# Patient Record
Sex: MALE | Race: WHITE | Employment: FULL TIME | URBAN - METROPOLITAN AREA
[De-identification: names, ages, dates, MRNs, and addresses within clinical notes are randomized per-mention and may not be internally consistent; named-entity substitution may affect disease eponyms.]

---

## 2019-08-04 ENCOUNTER — HOSPITAL ENCOUNTER (EMERGENCY)
Age: 64
Discharge: HOME | End: 2019-08-04
Attending: EMERGENCY MEDICINE
Payer: COMMERCIAL

## 2019-08-04 ENCOUNTER — APPOINTMENT (OUTPATIENT)
Dept: GENERAL RADIOLOGY | Age: 64
End: 2019-08-04
Attending: EMERGENCY MEDICINE
Payer: COMMERCIAL

## 2019-08-04 VITALS
OXYGEN SATURATION: 94 % | WEIGHT: 193.56 LBS | DIASTOLIC BLOOD PRESSURE: 87 MMHG | RESPIRATION RATE: 17 BRPM | SYSTOLIC BLOOD PRESSURE: 121 MMHG | TEMPERATURE: 98 F | HEART RATE: 75 BPM

## 2019-08-04 DIAGNOSIS — R07.89 ATYPICAL CHEST PAIN: Primary | ICD-10-CM

## 2019-08-04 LAB
ANION GAP SERPL CALC-SCNC: 12 MMOL/L (ref 5–15)
ATRIAL RATE: 87 BPM
BASOPHILS # BLD: 0 K/UL (ref 0–0.1)
BASOPHILS NFR BLD: 1 % (ref 0–1)
BUN SERPL-MCNC: 15 MG/DL (ref 6–20)
BUN/CREAT SERPL: 14 (ref 12–20)
CALCIUM SERPL-MCNC: 9 MG/DL (ref 8.5–10.1)
CALCULATED P AXIS, ECG09: 79 DEGREES
CALCULATED R AXIS, ECG10: 0 DEGREES
CALCULATED T AXIS, ECG11: 29 DEGREES
CHLORIDE SERPL-SCNC: 102 MMOL/L (ref 97–108)
CO2 SERPL-SCNC: 28 MMOL/L (ref 21–32)
CREAT SERPL-MCNC: 1.04 MG/DL (ref 0.7–1.3)
D DIMER PPP FEU-MCNC: 0.27 MG/L FEU (ref 0–0.65)
DIAGNOSIS, 93000: NORMAL
DIFFERENTIAL METHOD BLD: ABNORMAL
EOSINOPHIL # BLD: 0.2 K/UL (ref 0–0.4)
EOSINOPHIL NFR BLD: 3 % (ref 0–7)
ERYTHROCYTE [DISTWIDTH] IN BLOOD BY AUTOMATED COUNT: 13.4 % (ref 11.5–14.5)
GLUCOSE SERPL-MCNC: 173 MG/DL (ref 65–100)
HCT VFR BLD AUTO: 43.5 % (ref 36.6–50.3)
HGB BLD-MCNC: 15.3 G/DL (ref 12.1–17)
IMM GRANULOCYTES # BLD AUTO: 0 K/UL (ref 0–0.04)
IMM GRANULOCYTES NFR BLD AUTO: 1 % (ref 0–0.5)
LYMPHOCYTES # BLD: 1.9 K/UL (ref 0.8–3.5)
LYMPHOCYTES NFR BLD: 30 % (ref 12–49)
MCH RBC QN AUTO: 30.4 PG (ref 26–34)
MCHC RBC AUTO-ENTMCNC: 35.2 G/DL (ref 30–36.5)
MCV RBC AUTO: 86.3 FL (ref 80–99)
MONOCYTES # BLD: 0.5 K/UL (ref 0–1)
MONOCYTES NFR BLD: 7 % (ref 5–13)
NEUTS SEG # BLD: 3.7 K/UL (ref 1.8–8)
NEUTS SEG NFR BLD: 58 % (ref 32–75)
NRBC # BLD: 0 K/UL (ref 0–0.01)
NRBC BLD-RTO: 0 PER 100 WBC
P-R INTERVAL, ECG05: 152 MS
PLATELET # BLD AUTO: 206 K/UL (ref 150–400)
PMV BLD AUTO: 9.1 FL (ref 8.9–12.9)
POTASSIUM SERPL-SCNC: 3.6 MMOL/L (ref 3.5–5.1)
Q-T INTERVAL, ECG07: 378 MS
QRS DURATION, ECG06: 96 MS
QTC CALCULATION (BEZET), ECG08: 454 MS
RBC # BLD AUTO: 5.04 M/UL (ref 4.1–5.7)
SODIUM SERPL-SCNC: 142 MMOL/L (ref 136–145)
TROPONIN I SERPL-MCNC: <0.05 NG/ML
TROPONIN I SERPL-MCNC: <0.05 NG/ML
VENTRICULAR RATE, ECG03: 87 BPM
WBC # BLD AUTO: 6.3 K/UL (ref 4.1–11.1)

## 2019-08-04 PROCEDURE — 71046 X-RAY EXAM CHEST 2 VIEWS: CPT

## 2019-08-04 PROCEDURE — 99285 EMERGENCY DEPT VISIT HI MDM: CPT

## 2019-08-04 PROCEDURE — 93005 ELECTROCARDIOGRAM TRACING: CPT

## 2019-08-04 PROCEDURE — 74011000250 HC RX REV CODE- 250: Performed by: EMERGENCY MEDICINE

## 2019-08-04 PROCEDURE — 96375 TX/PRO/DX INJ NEW DRUG ADDON: CPT

## 2019-08-04 PROCEDURE — 84484 ASSAY OF TROPONIN QUANT: CPT

## 2019-08-04 PROCEDURE — 96374 THER/PROPH/DIAG INJ IV PUSH: CPT

## 2019-08-04 PROCEDURE — 85025 COMPLETE CBC W/AUTO DIFF WBC: CPT

## 2019-08-04 PROCEDURE — 74011250636 HC RX REV CODE- 250/636

## 2019-08-04 PROCEDURE — 74011250637 HC RX REV CODE- 250/637: Performed by: EMERGENCY MEDICINE

## 2019-08-04 PROCEDURE — 85379 FIBRIN DEGRADATION QUANT: CPT

## 2019-08-04 PROCEDURE — 80048 BASIC METABOLIC PNL TOTAL CA: CPT

## 2019-08-04 PROCEDURE — 36415 COLL VENOUS BLD VENIPUNCTURE: CPT

## 2019-08-04 RX ORDER — GUAIFENESIN 100 MG/5ML
324 LIQUID (ML) ORAL
Status: COMPLETED | OUTPATIENT
Start: 2019-08-04 | End: 2019-08-04

## 2019-08-04 RX ORDER — FAMOTIDINE 20 MG/1
20 TABLET, FILM COATED ORAL
Status: COMPLETED | OUTPATIENT
Start: 2019-08-04 | End: 2019-08-04

## 2019-08-04 RX ORDER — MORPHINE SULFATE 2 MG/ML
2 INJECTION, SOLUTION INTRAMUSCULAR; INTRAVENOUS
Status: COMPLETED | OUTPATIENT
Start: 2019-08-04 | End: 2019-08-04

## 2019-08-04 RX ORDER — MORPHINE SULFATE 2 MG/ML
INJECTION, SOLUTION INTRAMUSCULAR; INTRAVENOUS
Status: DISCONTINUED
Start: 2019-08-04 | End: 2019-08-04 | Stop reason: HOSPADM

## 2019-08-04 RX ORDER — NITROGLYCERIN 0.4 MG/1
0.4 TABLET SUBLINGUAL
Status: DISCONTINUED | OUTPATIENT
Start: 2019-08-04 | End: 2019-08-04 | Stop reason: HOSPADM

## 2019-08-04 RX ADMIN — NITROGLYCERIN 0.4 MG: 0.4 TABLET, ORALLY DISINTEGRATING SUBLINGUAL at 00:38

## 2019-08-04 RX ADMIN — FAMOTIDINE 20 MG: 20 TABLET ORAL at 02:53

## 2019-08-04 RX ADMIN — LIDOCAINE HYDROCHLORIDE 40 ML: 20 SOLUTION ORAL; TOPICAL at 00:36

## 2019-08-04 RX ADMIN — MORPHINE SULFATE 2 MG: 2 INJECTION, SOLUTION INTRAMUSCULAR; INTRAVENOUS at 01:04

## 2019-08-04 RX ADMIN — ASPIRIN 81 MG 324 MG: 81 TABLET ORAL at 00:36

## 2019-08-04 RX ADMIN — NITROGLYCERIN 0.4 MG: 0.4 TABLET, ORALLY DISINTEGRATING SUBLINGUAL at 00:46

## 2019-08-04 NOTE — DISCHARGE INSTRUCTIONS

## 2019-08-04 NOTE — ED TRIAGE NOTES
Chest pain for a few hours. Denies SOB or n/v. States that it is left sides and non radiating.  Hx of MI 2005

## 2019-08-04 NOTE — ED PROVIDER NOTES
The history is provided by the patient. Chest Pain (Angina)    This is a new problem. The current episode started 1 to 2 hours ago. The problem has not changed since onset. Duration of episode(s) is 1 hour. The problem occurs constantly. The pain is associated with breathing. The pain is present in the left side. The pain is at a severity of 4/10. Quality: undescribable but different from MI. The pain does not radiate. The symptoms are aggravated by deep breathing. Pertinent negatives include no abdominal pain, no cough, no diaphoresis, no dizziness, no fever, no nausea, no shortness of breath and no vomiting. He has tried nothing for the symptoms. Risk factors include male gender and cardiac disease. His past medical history does not include DM or HTN. Procedural history includes cardiac stents. Past Medical History:   Diagnosis Date    Heart attack [facility]) 2005       History reviewed. No pertinent surgical history. History reviewed. No pertinent family history.     Social History     Socioeconomic History    Marital status: UNKNOWN     Spouse name: Not on file    Number of children: Not on file    Years of education: Not on file    Highest education level: Not on file   Occupational History    Not on file   Social Needs    Financial resource strain: Not on file    Food insecurity:     Worry: Not on file     Inability: Not on file    Transportation needs:     Medical: Not on file     Non-medical: Not on file   Tobacco Use    Smoking status: Never Smoker    Smokeless tobacco: Never Used   Substance and Sexual Activity    Alcohol use: Not on file    Drug use: Not on file    Sexual activity: Not on file   Lifestyle    Physical activity:     Days per week: Not on file     Minutes per session: Not on file    Stress: Not on file   Relationships    Social connections:     Talks on phone: Not on file     Gets together: Not on file     Attends Zoroastrianism service: Not on file     Active member of club or organization: Not on file     Attends meetings of clubs or organizations: Not on file     Relationship status: Not on file    Intimate partner violence:     Fear of current or ex partner: Not on file     Emotionally abused: Not on file     Physically abused: Not on file     Forced sexual activity: Not on file   Other Topics Concern    Not on file   Social History Narrative    Not on file         ALLERGIES: Penicillins    Review of Systems   Constitutional: Negative for chills, diaphoresis and fever. Respiratory: Negative for cough and shortness of breath. Cardiovascular: Positive for chest pain. Negative for leg swelling. Gastrointestinal: Negative for abdominal pain, constipation, diarrhea, nausea and vomiting. Neurological: Negative for dizziness and light-headedness. All other systems reviewed and are negative. There were no vitals filed for this visit. Physical Exam   Constitutional: He is oriented to person, place, and time. He appears well-developed and well-nourished. No distress. HENT:   Head: Normocephalic and atraumatic. Eyes: Pupils are equal, round, and reactive to light. Conjunctivae are normal.   Neck: Normal range of motion. Cardiovascular: Normal rate and regular rhythm. Pulmonary/Chest: Effort normal and breath sounds normal.   Abdominal: Soft. He exhibits no distension. There is no tenderness. Musculoskeletal: Normal range of motion. Right lower leg: He exhibits no tenderness and no edema. Left lower leg: He exhibits no tenderness and no edema. Neurological: He is alert and oriented to person, place, and time. Skin: Skin is warm and dry. Capillary refill takes less than 2 seconds. Nursing note and vitals reviewed. MDM  Number of Diagnoses or Management Options  Atypical chest pain:   Diagnosis management comments: The patient is resting comfortably and feels better, is alert and in no distress.  The repeat examination is unremarkable and benign. The electrocardiogram shows no signs of acute ischemia and the history, exam, diagnostic testing and current condition do not suggest that this patient is having an acute myocardial infarction, significant arrhythmia, unstable angina, esophageal perforation, pulmonary embolism, aortic dissection, pneumothorax, severe pneumonia, sepsis or other significant pathology that would warrant further testing, continued ED treatment, admission, or cardiology or other specialist consultation at this point. The vital signs have been stable. The patient's condition is stable and appropriate for discharge. The patient will pursue further outpatient evaluation with the primary care physician, other designated physician or cardiologist. The patient and/or caregivers have expressed a clear and thorough understanding and agree to follow up as instructed.            Procedures        EKG @ 00:19  NSR, 87 bpm, no ST changes, no T wave changes, no ectopy  EKG interpreted by Randi Frederick MD

## 2022-10-24 ENCOUNTER — APPOINTMENT (OUTPATIENT)
Dept: GENERAL RADIOLOGY | Age: 67
End: 2022-10-24
Attending: EMERGENCY MEDICINE

## 2022-10-24 ENCOUNTER — HOSPITAL ENCOUNTER (EMERGENCY)
Age: 67
Discharge: HOME | End: 2022-10-24
Attending: EMERGENCY MEDICINE

## 2022-10-24 VITALS
SYSTOLIC BLOOD PRESSURE: 137 MMHG | OXYGEN SATURATION: 98 % | RESPIRATION RATE: 17 BRPM | TEMPERATURE: 97.9 F | DIASTOLIC BLOOD PRESSURE: 77 MMHG | HEART RATE: 76 BPM

## 2022-10-24 DIAGNOSIS — S39.012A LUMBAR STRAIN, INITIAL ENCOUNTER: Primary | ICD-10-CM

## 2022-10-24 PROCEDURE — 99283 EMERGENCY DEPT VISIT LOW MDM: CPT

## 2022-10-24 PROCEDURE — 72100 X-RAY EXAM L-S SPINE 2/3 VWS: CPT

## 2022-10-24 RX ORDER — CYCLOBENZAPRINE HCL 10 MG
10 TABLET ORAL
Qty: 15 TABLET | Refills: 0 | Status: SHIPPED | OUTPATIENT
Start: 2022-10-24 | End: 2022-10-29

## 2022-10-24 RX ORDER — IBUPROFEN 800 MG/1
800 TABLET ORAL
Qty: 30 TABLET | Refills: 0 | Status: SHIPPED | OUTPATIENT
Start: 2022-10-24

## 2022-10-24 NOTE — ED PROVIDER NOTES
20-year-old male with lower back pain from twisting in a chair 2 days ago. He has some mild pain radiating down the posterior part of his left leg. He has off-and-on back pain for about 40 years but never required surgery or referral to neurosurgery. He has never had an MRI. No fever or chills no urine symptoms. The history is provided by the patient. LOW BACK PAIN   This is a recurrent problem. The current episode started 2 days ago. The problem has been gradually improving. The problem occurs constantly. Patient reports not work related injury. The pain is associated with no known injury. The pain is present in the lower back. The quality of the pain is described as shooting. The pain is mild. Pertinent negatives include no chest pain, no fever, no numbness, no weight loss, no headaches, no abdominal pain, no abdominal swelling, no bowel incontinence, no bladder incontinence, no dysuria and no weakness. He has tried nothing for the symptoms. Past Medical History:   Diagnosis Date    Heart attack ([facility] [address] 2005       No past surgical history on file. No family history on file.     Social History     Socioeconomic History    Marital status:      Spouse name: Not on file    Number of children: Not on file    Years of education: Not on file    Highest education level: Not on file   Occupational History    Not on file   Tobacco Use    Smoking status: Never    Smokeless tobacco: Never   Substance and Sexual Activity    Alcohol use: Not on file    Drug use: Not on file    Sexual activity: Not on file   Other Topics Concern    Not on file   Social History Narrative    Not on file     Social Determinants of Health     Financial Resource Strain: Not on file   Food Insecurity: Not on file   Transportation Needs: Not on file   Physical Activity: Not on file   Stress: Not on file   Social Connections: Not on file   Intimate Partner Violence: Not on file   Housing Stability: Not on file         ALLERGIES: Penicillins    Review of Systems   Constitutional:  Negative for chills, fever and weight loss. HENT:  Negative for congestion, rhinorrhea, sneezing and sore throat. Eyes:  Negative for redness and visual disturbance. Respiratory:  Negative for shortness of breath. Cardiovascular:  Negative for chest pain and leg swelling. Gastrointestinal:  Negative for abdominal pain, bowel incontinence, nausea and vomiting. Genitourinary:  Negative for bladder incontinence, difficulty urinating, dysuria and frequency. Musculoskeletal:  Negative for back pain, myalgias and neck stiffness. Skin:  Negative for rash. Neurological:  Negative for dizziness, syncope, weakness, numbness and headaches. Hematological:  Negative for adenopathy. All other systems reviewed and are negative. Vitals:    10/24/22 1326 10/24/22 1448   BP: (!) 142/86    Pulse: 78 76   Resp: 16 17   Temp: 97.9 °F (36.6 °C)    SpO2: 97%             Physical Exam  Vitals and nursing note reviewed. Constitutional:       Appearance: Normal appearance. He is well-developed. HENT:      Head: Normocephalic and atraumatic. Cardiovascular:      Rate and Rhythm: Normal rate and regular rhythm. Pulses: Normal pulses. Heart sounds: Normal heart sounds. Pulmonary:      Effort: Pulmonary effort is normal. No respiratory distress. Breath sounds: Normal breath sounds. Chest:      Chest wall: No tenderness. Abdominal:      General: Bowel sounds are normal.      Palpations: Abdomen is soft. Tenderness: There is no abdominal tenderness. There is no guarding or rebound. Musculoskeletal:      Cervical back: Full passive range of motion without pain, normal range of motion and neck supple. Skin:     General: Skin is warm and dry. Findings: No erythema or rash. Neurological:      Mental Status: He is alert and oriented to person, place, and time.    Psychiatric:         Speech: Speech normal.         Behavior: Behavior normal.         Thought Content: Thought content normal.         Judgment: Judgment normal.        MDM  Number of Diagnoses or Management Options  Lumbar strain, initial encounter  Diagnosis management comments: Back pain after twisting movement and history of back pain as well. He is otherwise stable. Will be treated for lumbar strain with Flexeril and ibuprofen.            Procedures

## 2022-10-24 NOTE — ED TRIAGE NOTES
Pt reports back pain since Saturday. Started as \"twinge\", now pain is worse radiating down L leg. No bladder/bowel incontinence. Hx of same. No relief with home meds.

## 2025-05-14 ENCOUNTER — HOSPITAL ENCOUNTER (OUTPATIENT)
Facility: HOSPITAL | Age: 70
Discharge: HOME | End: 2025-05-14
Attending: INTERNAL MEDICINE | Admitting: INTERNAL MEDICINE
Payer: MEDICARE

## 2025-05-14 VITALS
HEART RATE: 71 BPM | TEMPERATURE: 97.6 F | BODY MASS INDEX: 28.56 KG/M2 | RESPIRATION RATE: 17 BRPM | DIASTOLIC BLOOD PRESSURE: 64 MMHG | SYSTOLIC BLOOD PRESSURE: 119 MMHG | WEIGHT: 182 LBS | HEIGHT: 67 IN | OXYGEN SATURATION: 92 %

## 2025-05-14 DIAGNOSIS — R94.39 ABNORMAL MYOCARDIAL PERFUSION STUDY: ICD-10-CM

## 2025-05-14 DIAGNOSIS — Z95.5 STENTED CORONARY ARTERY: Primary | ICD-10-CM

## 2025-05-14 LAB — ECHO BSA: 1.98 M2

## 2025-05-14 PROCEDURE — 92978 ENDOLUMINL IVUS OCT C 1ST: CPT | Performed by: INTERNAL MEDICINE

## 2025-05-14 PROCEDURE — C1725 CATH, TRANSLUMIN NON-LASER: HCPCS | Performed by: INTERNAL MEDICINE

## 2025-05-14 PROCEDURE — 2500000003 HC RX 250 WO HCPCS: Performed by: INTERNAL MEDICINE

## 2025-05-14 PROCEDURE — 99152 MOD SED SAME PHYS/QHP 5/>YRS: CPT | Performed by: INTERNAL MEDICINE

## 2025-05-14 PROCEDURE — C1887 CATHETER, GUIDING: HCPCS | Performed by: INTERNAL MEDICINE

## 2025-05-14 PROCEDURE — 2580000003 HC RX 258: Performed by: INTERNAL MEDICINE

## 2025-05-14 PROCEDURE — 93458 L HRT ARTERY/VENTRICLE ANGIO: CPT | Performed by: INTERNAL MEDICINE

## 2025-05-14 PROCEDURE — 99153 MOD SED SAME PHYS/QHP EA: CPT | Performed by: INTERNAL MEDICINE

## 2025-05-14 PROCEDURE — C1769 GUIDE WIRE: HCPCS | Performed by: INTERNAL MEDICINE

## 2025-05-14 PROCEDURE — 6360000002 HC RX W HCPCS: Performed by: INTERNAL MEDICINE

## 2025-05-14 PROCEDURE — 6360000004 HC RX CONTRAST MEDICATION: Performed by: INTERNAL MEDICINE

## 2025-05-14 PROCEDURE — C1713 ANCHOR/SCREW BN/BN,TIS/BN: HCPCS | Performed by: INTERNAL MEDICINE

## 2025-05-14 PROCEDURE — C9600 PERC DRUG-EL COR STENT SING: HCPCS | Performed by: INTERNAL MEDICINE

## 2025-05-14 PROCEDURE — 2709999900 HC NON-CHARGEABLE SUPPLY: Performed by: INTERNAL MEDICINE

## 2025-05-14 PROCEDURE — C1753 CATH, INTRAVAS ULTRASOUND: HCPCS | Performed by: INTERNAL MEDICINE

## 2025-05-14 PROCEDURE — 76937 US GUIDE VASCULAR ACCESS: CPT | Performed by: INTERNAL MEDICINE

## 2025-05-14 PROCEDURE — C1874 STENT, COATED/COV W/DEL SYS: HCPCS | Performed by: INTERNAL MEDICINE

## 2025-05-14 PROCEDURE — 93005 ELECTROCARDIOGRAM TRACING: CPT | Performed by: INTERNAL MEDICINE

## 2025-05-14 PROCEDURE — C1894 INTRO/SHEATH, NON-LASER: HCPCS | Performed by: INTERNAL MEDICINE

## 2025-05-14 PROCEDURE — 6370000000 HC RX 637 (ALT 250 FOR IP): Performed by: INTERNAL MEDICINE

## 2025-05-14 DEVICE — STENT ONYXNG40012UX ONYX 4.00X12RX
Type: IMPLANTABLE DEVICE | Status: FUNCTIONAL
Brand: ONYX FRONTIER™

## 2025-05-14 RX ORDER — ACETAMINOPHEN 325 MG/1
650 TABLET ORAL EVERY 4 HOURS PRN
Status: DISCONTINUED | OUTPATIENT
Start: 2025-05-14 | End: 2025-05-14 | Stop reason: HOSPADM

## 2025-05-14 RX ORDER — FENTANYL CITRATE 50 UG/ML
INJECTION, SOLUTION INTRAMUSCULAR; INTRAVENOUS PRN
Status: DISCONTINUED | OUTPATIENT
Start: 2025-05-14 | End: 2025-05-14 | Stop reason: HOSPADM

## 2025-05-14 RX ORDER — MIDAZOLAM HYDROCHLORIDE 1 MG/ML
INJECTION, SOLUTION INTRAMUSCULAR; INTRAVENOUS PRN
Status: DISCONTINUED | OUTPATIENT
Start: 2025-05-14 | End: 2025-05-14 | Stop reason: HOSPADM

## 2025-05-14 RX ORDER — SODIUM CHLORIDE 9 MG/ML
INJECTION, SOLUTION INTRAVENOUS PRN
Status: DISCONTINUED | OUTPATIENT
Start: 2025-05-14 | End: 2025-05-14 | Stop reason: HOSPADM

## 2025-05-14 RX ORDER — ROSUVASTATIN CALCIUM 5 MG/1
5 TABLET, COATED ORAL DAILY
COMMUNITY

## 2025-05-14 RX ORDER — SODIUM CHLORIDE 0.9 % (FLUSH) 0.9 %
5-40 SYRINGE (ML) INJECTION PRN
Status: DISCONTINUED | OUTPATIENT
Start: 2025-05-14 | End: 2025-05-14 | Stop reason: HOSPADM

## 2025-05-14 RX ORDER — METOPROLOL TARTRATE 50 MG
50 TABLET ORAL 2 TIMES DAILY
COMMUNITY

## 2025-05-14 RX ORDER — HEPARIN SODIUM 1000 [USP'U]/ML
INJECTION, SOLUTION INTRAVENOUS; SUBCUTANEOUS PRN
Status: DISCONTINUED | OUTPATIENT
Start: 2025-05-14 | End: 2025-05-14 | Stop reason: HOSPADM

## 2025-05-14 RX ORDER — BIVALIRUDIN 250 MG/5ML
INJECTION, POWDER, LYOPHILIZED, FOR SOLUTION INTRAVENOUS PRN
Status: DISCONTINUED | OUTPATIENT
Start: 2025-05-14 | End: 2025-05-14 | Stop reason: HOSPADM

## 2025-05-14 RX ORDER — VERAPAMIL HYDROCHLORIDE 2.5 MG/ML
INJECTION INTRAVENOUS PRN
Status: DISCONTINUED | OUTPATIENT
Start: 2025-05-14 | End: 2025-05-14 | Stop reason: HOSPADM

## 2025-05-14 RX ORDER — IOPAMIDOL 755 MG/ML
INJECTION, SOLUTION INTRAVASCULAR PRN
Status: DISCONTINUED | OUTPATIENT
Start: 2025-05-14 | End: 2025-05-14 | Stop reason: HOSPADM

## 2025-05-14 RX ORDER — CLOPIDOGREL BISULFATE 75 MG/1
75 TABLET ORAL DAILY
Qty: 90 TABLET | Refills: 3 | Status: SHIPPED | OUTPATIENT
Start: 2025-05-14

## 2025-05-14 RX ORDER — SODIUM CHLORIDE 0.9 % (FLUSH) 0.9 %
5-40 SYRINGE (ML) INJECTION EVERY 12 HOURS SCHEDULED
Status: DISCONTINUED | OUTPATIENT
Start: 2025-05-14 | End: 2025-05-14 | Stop reason: HOSPADM

## 2025-05-14 RX ORDER — TAMSULOSIN HYDROCHLORIDE 0.4 MG/1
0.4 CAPSULE ORAL DAILY
COMMUNITY

## 2025-05-14 RX ORDER — ASPIRIN 81 MG/1
81 TABLET ORAL DAILY
COMMUNITY

## 2025-05-14 RX ORDER — LIDOCAINE HYDROCHLORIDE 10 MG/ML
INJECTION, SOLUTION INFILTRATION; PERINEURAL PRN
Status: DISCONTINUED | OUTPATIENT
Start: 2025-05-14 | End: 2025-05-14 | Stop reason: HOSPADM

## 2025-05-14 NOTE — PROGRESS NOTES
CATH LAB to RECOVERY ROOM REPORT    Procedure: PCI with Stent Placement    MD: ROSALVA Doshi MD And DR. Liao for INTERVENTION    The procedure was an intervention with 1 stent(s) placed to the LAD .    Verbal Report given to Recovery Nurse on patient being transferred to Cardiac Cath Lab  for routine post-op. Patient stable upon transfer to .  Vitals, mental status, MAR, procedural summary discussed with recovery RN.    Medication given during procedure:    Contrast:140 mL                          Heparin: 2,000 units     Versed: 4 mg                                                               Fentanyl: 100  mcg                                                           Plavix:600  mg           Angiomax running 28.9 ml/hr.  Stop drip 2:30 pm     Other Meds/Drips given:  Verapamil 2.5 mg  Nitroglycerin 1 inch topical applied           Sheaths:    Right radial sheath pulled at 1:25 pm, band at 12  mL of air

## 2025-05-14 NOTE — PROCEDURES
PROCEDURE NOTE  Date: 5/14/2025   Name: Adair Mcgowan  YOB: 1955    Procedures      Cardiac Catheterization Procedure Note   Patient: Adair Mcgowan  MRN: [id number]  SSN: [id number]   YOB: 1955 Age: 69 y.o.  Sex: male    Date of Procedure: 5/14/2025   Pre-procedure Diagnosis: CAD, abnormal stress  Post-procedure Diagnosis: CAD, abnormal stress  Procedure: LHC, Cors, US guided access, moderate sedation  :  Dr. Moise Doshi MD    Assistant(s):  None  Anesthesia: Moderate Sedation   Estimated Blood Loss: Less than 10 mL   Specimens Removed: None  Findings: Right dominant coronary system with significant disease in LAD, patent previously placed RCA stent, normal LVEDP  Complications: None   Implants:  None  Signed by:  Moise Doshi MD  5/14/2025  12:57 PM

## 2025-05-14 NOTE — DISCHARGE INSTRUCTIONS
Patient Discharge Instructions    Adair Mcgowan / [id number] : 1955    Admitted 2025 Discharged: 2025         CARDIAC CATHETERIZATION/ “CATH” STENT PLACEMENT   DISCHARGE INSTRUCTIONS    If possible, have someone stay with you for the first night. It is normal to feel tired for the first couple of days.  Take it easy and follow your physician’s instructions on activity.    CHECK THE CATHETER INSERTION SITE DAILY:    If bleeding at the cath site occurs, take a clean washcloth and apply direct pressure just above the puncture site for at least 15 minutes.  Call 911 immediately if the bleeding is not controlled.  Continue to apply direct pressure until an ambulance gets to your location. Do not try to drive yourself or have someone else drive you to the hospital.  Have the ambulance bring you to the emergency room.    You may shower 24 hours after your procedure. Gently remove the bandage during showering.  Wash with soap and water and pat dry.  To prevent infection, keep the groin area/insertion site clean and dry.  Do not apply powders, creams, lotions, or ointments to the site for 5 days. You may cover the site with a fresh Band-Aid each day until well healed.  You may notice a small lump at the site. This is normal and may last up to 6 weeks.    CALL THE PHYSICIAN:  If the site becomes red, swollen, or feels warm to the touch, or is healing poorly    If you note any large/extending bruise, fever >101.0 or chills  If your extremity has numbness, tingling, discoloration, abnormal swelling, tightness or pain   If you have difficulty with urination or develop nausea, vomiting, or severe abdominal pain    ACTIVITY for the first 24-48 hours, or as instructed by your physician:  No lifting, pushing or pulling over 10 pounds and no straining the insertion site. Do not lift grocery bags or the garbage can; do not run the vacuum  or  for 7 days.  You may start walking short distances

## 2025-05-14 NOTE — PROGRESS NOTES
Cardiac Cath Lab Procedure Area Arrival Note:    Adair Mcgowan arrived to Cardiac Cath Lab, Procedure Area. Patient identifiers verified with NAME and DATE OF BIRTH. Procedure verified with patient. Consent forms verified. Allergies verified. Patient informed of procedure and plan of care. Questions answered with review. Patient voiced understanding of procedure and plan of care.    Patient on cardiac monitor, non-invasive blood pressure, SPO2 monitor. On   O2 @ 2 lpm via nc.  IV of NS on pump at 25 ml/hr. Patient status doing well without problems. Patient is A&Ox 4. Patient reports no pain or other concerns.     Patient medicated during procedure with orders obtained and verified by Dr. chawla.    Refer to patients Cardiac Cath Lab PROCEDURE REPORT for vital signs, assessment, status, and response during procedure, printed at end of case. Printed report on chart or scanned into chart.

## 2025-05-14 NOTE — CARDIO/PULMONARY
Chart reviewed: Patient is 69 y.o. male admitted with Abnormal myocardial perfusion study [R94.39]    Education: CAD education folder given to Adair Mcgowan.      Educated using teach back method. Reviewed CAD diagnosis definition and purpose of intervention. Emphasized the value of cardiac rehab. Discussed Cardiac Rehab Program format, benefits, and encouraged enrollment to assist with risk modification and management. Initial Cardiac Rehab Program intake appointment scheduled for 5/20/25 at 9 am and appointment information is on the AVS.    Patient stated \"I am raising 2 children\". He has an 8 year old and a 15 year old at home.     Adair Mcgowan verbalized understanding with questions answered.     AGUSTINA DYE RN   Price (Do Not Change): 0.00 Detail Level: Simple Instructions: This plan will send the code FBSE to the PM system.  DO NOT or CHANGE the price.

## 2025-05-14 NOTE — PROGRESS NOTES
Cardiac Cath Lab Recovery Arrival Note:      Adair Mcgowan arrived to Cardiac Cath Lab, Recovery Area. Staff introduced to patient. Patient identifiers verified with NAME and DATE OF BIRTH. Procedure verified with patient. Consent forms reviewed and signed by patient or authorized representative and verified. Allergies verified.     Patient and family oriented to department. Patient and family informed of procedure and plan of care.     Questions answered with review. Patient prepped for procedure, per orders from physician, prior to arrival.    Patient on cardiac monitor, non-invasive blood pressure, SPO2 monitor. On room air. Patient is A&Ox 4. Patient reports no pain.     Patient in stretcher, in low position, with side rails up, call bell within reach, patient instructed to call if assistance as needed.    Patient prep in: [facility] Recovery Area, [place] FAST TRACK 2.   Patient family has pager # [phone]  Family in: [phone].   Prep by: ANGIE

## 2025-05-14 NOTE — PROGRESS NOTES
Ambulated patient to the bathroom with a steady gait with steady gait, voided without difficulty. Returned to stretcher. Vital signs stable.     Radial Site is clean, dry, and intact. No bleeding, no hematoma.     Patient dressed self.     Educated patient about their sedation precautions such as not driving, operating any machinery, or signing legal documents 24 hours post procedure.     Reviewed discharge instructions, including medications, and site care, Patient educated about their post-PCI medications, regarding aspirin, Plavix, statins, and their blood pressure medication. The patient was given their stent card and instructed to keep it in their wallet.  using the teach back method. Answered all questions. Verbalized understanding.     Removed peripheral IV    Escorted to discharge area in a wheelchair with all of their belongings including clothing, cell phone, stent card, discharge instructions    1750--spouse present to take patient home. Reviewed discharge instructions with spouse. Verbalized understanding.

## 2025-05-14 NOTE — PROCEDURES
PROCEDURE NOTE  Date: 5/14/2025   Name: Adair Mcgowan  YOB: 1955    Procedures    Cardiac Catheterization Procedure Note   Patient: Adair Mcgowan  MRN: [id number]  SSN: [id number]   YOB: 1955 Age: 69 y.o.  Sex: male    Date of Procedure: 5/14/2025   Pre-procedure Diagnosis: Coronary Artery Disease and Abnormal stress test  Post-procedure Diagnosis: Coronary Artery Disease  Procedure: PCI of the LAD, IVUS of the LAD  :  Dr. Eren Liao MD    Assistant(s):  None  Anesthesia: Moderate Sedation   Estimated Blood Loss: Less than 10 mL   Specimens Removed: None    Access: right radial    Note: balloon assisted tracking was needed to advance the guide from the right radial to the ascending aorta.    Findings:   Diagnostic coronary angiography performed by Dr Doshi showed 80-90% stenosis in the proximal LAD    LAD Intervention:    Guide: EBU 3.5  Anticoagulation: Angiomax  Lesion crossing: Runthrough    IVUS of the LAD was performed for vessel sizing and lesion characterization.  Reference vessel size was 4.0mm    PTCA of the proximal LAD was performed using a 4.0x12mm balloon  A 4.0x12mm Rafael NAYELI was implanted in the proximal LAD.    Final IVUS and angiography showed excellent results with no residual stenosis, BONNY 3 flow, no dissection and well apposed well expanded stents.     Complications: None   Implants:  NAYELI x 1  Signed by:  Eren Liao MD  5/14/2025  1:29 PM

## 2025-05-16 LAB
EKG ATRIAL RATE: 68 BPM
EKG DIAGNOSIS: NORMAL
EKG P AXIS: 61 DEGREES
EKG P-R INTERVAL: 166 MS
EKG Q-T INTERVAL: 422 MS
EKG QRS DURATION: 100 MS
EKG QTC CALCULATION (BAZETT): 448 MS
EKG R AXIS: -1 DEGREES
EKG T AXIS: 31 DEGREES
EKG VENTRICULAR RATE: 68 BPM

## 2025-05-16 PROCEDURE — 93010 ELECTROCARDIOGRAM REPORT: CPT | Performed by: SPECIALIST

## 2025-05-20 ENCOUNTER — HOSPITAL ENCOUNTER (OUTPATIENT)
Facility: HOSPITAL | Age: 70
Setting detail: RECURRING SERIES
Discharge: HOME | End: 2025-05-23
Attending: INTERNAL MEDICINE
Payer: MEDICARE

## 2025-05-20 VITALS — WEIGHT: 186 LBS | HEART RATE: 66 BPM | BODY MASS INDEX: 29.19 KG/M2 | HEIGHT: 67 IN

## 2025-05-20 PROCEDURE — 93798 PHYS/QHP OP CAR RHAB W/ECG: CPT

## 2025-05-20 PROCEDURE — 93797 PHYS/QHP OP CAR RHAB WO ECG: CPT

## 2025-05-20 ASSESSMENT — PATIENT HEALTH QUESTIONNAIRE - PHQ9
SUM OF ALL RESPONSES TO PHQ QUESTIONS 1-9: 6
5. POOR APPETITE OR OVEREATING: SEVERAL DAYS
4. FEELING TIRED OR HAVING LITTLE ENERGY: SEVERAL DAYS
SUM OF ALL RESPONSES TO PHQ QUESTIONS 1-9: 6
7. TROUBLE CONCENTRATING ON THINGS, SUCH AS READING THE NEWSPAPER OR WATCHING TELEVISION: NOT AT ALL
10. IF YOU CHECKED OFF ANY PROBLEMS, HOW DIFFICULT HAVE THESE PROBLEMS MADE IT FOR YOU TO DO YOUR WORK, TAKE CARE OF THINGS AT HOME, OR GET ALONG WITH OTHER PEOPLE: NOT DIFFICULT AT ALL
8. MOVING OR SPEAKING SO SLOWLY THAT OTHER PEOPLE COULD HAVE NOTICED. OR THE OPPOSITE, BEING SO FIGETY OR RESTLESS THAT YOU HAVE BEEN MOVING AROUND A LOT MORE THAN USUAL: NOT AT ALL
SUM OF ALL RESPONSES TO PHQ QUESTIONS 1-9: 6
2. FEELING DOWN, DEPRESSED OR HOPELESS: SEVERAL DAYS
3. TROUBLE FALLING OR STAYING ASLEEP: SEVERAL DAYS
6. FEELING BAD ABOUT YOURSELF - OR THAT YOU ARE A FAILURE OR HAVE LET YOURSELF OR YOUR FAMILY DOWN: SEVERAL DAYS
9. THOUGHTS THAT YOU WOULD BE BETTER OFF DEAD, OR OF HURTING YOURSELF: NOT AT ALL
SUM OF ALL RESPONSES TO PHQ QUESTIONS 1-9: 6
1. LITTLE INTEREST OR PLEASURE IN DOING THINGS: SEVERAL DAYS

## 2025-05-20 ASSESSMENT — EXERCISE STRESS TEST
PEAK_HR: 85
PEAK_BP: 150/77
PEAK_RPE: 11
PEAK_METS: 2.1

## 2025-05-20 ASSESSMENT — EJECTION FRACTION: EF_VALUE: 55

## 2025-05-20 NOTE — CARDIO/PULMONARY
INTAKE APPOINTMENT NOTE  2025    NAME: Adair Mcgowan : 1955 AGE: 69 y.o.  GENDER: male    CARDIAC REHAB ADMITTING DIAGNOSIS: Stented coronary artery [Z95.5]    REFERRING PHYSICIAN: Moise Doshi, *    MEDICAL HX:  Past Medical History:   Diagnosis Date    CAD (coronary artery disease)     Heart attack (HCC)     Hyperlipidemia     Hypertension        LABS:     No results found for: \"HBA1C\", \"DUZ1BDHG\"  No results found for: \"CHOL\", \"CHOLPOCT\", \"CHLST\", \"CHOLV\", \"HDL\", \"HDLPOC\", \"HDLC\", \"LDL\", \"LDLC\", \"VLDLC\", \"VLDL\"      ANTHROPOMETRICS:      Ht Readings from Last 1 Encounters:   25 1.702 m (5' 7\")      Wt Readings from Last 1 Encounters:   25 84.4 kg (186 lb)        WAIST: 41       VISIT SUMMARY:    Adair Mcgowan 69 y.o. presented to Cardiac Rehab for program orientation and 6 minute walk test today with a primary diagnosis of Stented coronary artery [Z95.5]. EF is 55 % Cardiac risk factors include family history, dyslipidemia, obesity, prior MI.  Patient is a former smoker having quit 2005.   Adair Mcgowan is  and lives with his wife and 2 grandchildren they are raising. Patient was evaluated for depression using the PHQ-9 assessment tool with a result of 6 which is considered mild.   Patient denied chest pain or SOB during 6 minute walk test and was in SR with occ PAC's. Patient walked  0.10 mi at a speed of 1.4 and grade of 0 for a final MET level of 2.1.   Exercise prescription developed using exercise tolerance results and patient stated goals, to be supplemented with home exercise recommendations. Education manual given to patient and reviewed. Patient will attend cardiac rehab 2 times/week which will include both exercise and education sessions.    Intake Start Time: 855  Intake End Time:    AYANA LARA RN

## 2025-05-22 ENCOUNTER — HOSPITAL ENCOUNTER (OUTPATIENT)
Facility: HOSPITAL | Age: 70
Setting detail: RECURRING SERIES
Discharge: HOME | End: 2025-05-25
Attending: INTERNAL MEDICINE
Payer: MEDICARE

## 2025-05-22 VITALS — WEIGHT: 182.8 LBS | BODY MASS INDEX: 28.63 KG/M2

## 2025-05-22 PROCEDURE — 93798 PHYS/QHP OP CAR RHAB W/ECG: CPT

## 2025-05-22 ASSESSMENT — EXERCISE STRESS TEST
PEAK_RPE: 12
PEAK_METS: 2.1

## 2025-05-27 ENCOUNTER — HOSPITAL ENCOUNTER (OUTPATIENT)
Facility: HOSPITAL | Age: 70
Setting detail: RECURRING SERIES
Discharge: HOME | End: 2025-05-30
Attending: INTERNAL MEDICINE
Payer: MEDICARE

## 2025-05-27 VITALS — WEIGHT: 186 LBS | BODY MASS INDEX: 29.13 KG/M2

## 2025-05-27 PROCEDURE — 93797 PHYS/QHP OP CAR RHAB WO ECG: CPT

## 2025-05-27 PROCEDURE — 93798 PHYS/QHP OP CAR RHAB W/ECG: CPT

## 2025-05-27 ASSESSMENT — EXERCISE STRESS TEST
PEAK_RPE: 12
PEAK_METS: 2.1

## 2025-05-29 ENCOUNTER — APPOINTMENT (OUTPATIENT)
Facility: HOSPITAL | Age: 70
End: 2025-05-29
Attending: INTERNAL MEDICINE
Payer: MEDICARE

## 2025-06-03 ENCOUNTER — APPOINTMENT (OUTPATIENT)
Facility: HOSPITAL | Age: 70
End: 2025-06-03
Attending: INTERNAL MEDICINE
Payer: MEDICARE

## 2025-06-05 ENCOUNTER — APPOINTMENT (OUTPATIENT)
Facility: HOSPITAL | Age: 70
End: 2025-06-05
Attending: INTERNAL MEDICINE
Payer: MEDICARE

## 2025-06-10 ENCOUNTER — APPOINTMENT (OUTPATIENT)
Facility: HOSPITAL | Age: 70
End: 2025-06-10
Attending: INTERNAL MEDICINE
Payer: MEDICARE

## 2025-06-12 ENCOUNTER — APPOINTMENT (OUTPATIENT)
Facility: HOSPITAL | Age: 70
End: 2025-06-12
Attending: INTERNAL MEDICINE
Payer: MEDICARE

## 2025-06-16 ENCOUNTER — HOSPITAL ENCOUNTER (OUTPATIENT)
Facility: HOSPITAL | Age: 70
Setting detail: RECURRING SERIES
Discharge: HOME | End: 2025-06-19
Attending: INTERNAL MEDICINE
Payer: MEDICARE

## 2025-06-16 VITALS — WEIGHT: 181 LBS | BODY MASS INDEX: 28.35 KG/M2

## 2025-06-16 PROCEDURE — 93798 PHYS/QHP OP CAR RHAB W/ECG: CPT

## 2025-06-16 PROCEDURE — 93797 PHYS/QHP OP CAR RHAB WO ECG: CPT

## 2025-06-16 ASSESSMENT — EXERCISE STRESS TEST
PEAK_RPE: 12
PEAK_METS: 2.1

## 2025-06-16 NOTE — CARDIO/PULMONARY
Cardiac Rehab Nutrition Assessment- 1:1 Evaluation  2025      NAME: Adair Mcgowan : 1955 AGE: 69 y.o.  GENDER: male  CARDIAC REHAB ADMITTING DIAGNOSIS: Stented coronary artery [Z95.5]    PROBLEM LIST:  There is no problem list on file for this patient.        LABS:   No results found for: \"HBA1C\", \"BBI6YHNH\"  No results found for: \"CHOL\", \"CHOLPOCT\", \"CHLST\", \"CHOLV\", \"HDL\", \"HDLPOC\", \"HDLC\", \"LDL\", \"LDLC\", \"VLDLC\", \"VLDL\"      MEDICATIONS/SUPPLEMENTS:   Scheduled Meds:  Continuous Infusions:  PRN Meds:.  Prior to Admission medications    Medication Sig Start Date End Date Taking? Authorizing Provider   aspirin 81 MG EC tablet Take 1 tablet by mouth daily    ProviderMara MD   rosuvastatin (CRESTOR) 5 MG tablet Take 1 tablet by mouth daily    ProviderMara MD   tamsulosin (FLOMAX) 0.4 MG capsule Take 1 capsule by mouth daily    ProviderMara MD   metoprolol tartrate (LOPRESSOR) 50 MG tablet Take 1 tablet by mouth 2 times daily    ProviderMara MD   clopidogrel (PLAVIX) 75 MG tablet Take 1 tablet by mouth daily 25   Moise Doshi MD   ibuprofen (ADVIL;MOTRIN) 800 MG tablet Take by mouth every 8 hours as needed  Patient not taking: Reported on 2025 10/24/22   Automatic Reconciliation, Ar           ANTHROPOMETRICS:    Ht Readings from Last 1 Encounters:   25 1.702 m (5' 7\")      Wt Readings from Last 10 Encounters:   25 82.1 kg (181 lb)   25 84.4 kg (186 lb)   25 82.9 kg (182 lb 12.8 oz)   25 84.4 kg (186 lb)   25 82.6 kg (182 lb)      BMI Readings from Last 10 Encounters:   25 28.35 kg/m²   25 29.13 kg/m²   25 28.63 kg/m²   25 29.13 kg/m²   25 28.51 kg/m²      IBW:145 # +/- 10%  %IBW: 128 % +/- 10%    BMI Category: Overweight  Waist (measured at intake): Waist Circumference (In): 41       Reported Wt Hx: Adair Mcgowan reports his weight was ~ 205 lbs 1 year ago and

## 2025-06-17 ENCOUNTER — APPOINTMENT (OUTPATIENT)
Facility: HOSPITAL | Age: 70
End: 2025-06-17
Attending: INTERNAL MEDICINE
Payer: MEDICARE

## 2025-06-19 ENCOUNTER — HOSPITAL ENCOUNTER (OUTPATIENT)
Facility: HOSPITAL | Age: 70
Setting detail: RECURRING SERIES
Discharge: HOME | End: 2025-06-22
Attending: INTERNAL MEDICINE
Payer: MEDICARE

## 2025-06-19 VITALS — WEIGHT: 181.6 LBS | BODY MASS INDEX: 28.44 KG/M2

## 2025-06-19 PROCEDURE — 93798 PHYS/QHP OP CAR RHAB W/ECG: CPT

## 2025-06-19 ASSESSMENT — EXERCISE STRESS TEST
PEAK_METS: 2.1
PEAK_RPE: 12

## 2025-06-24 ENCOUNTER — HOSPITAL ENCOUNTER (OUTPATIENT)
Facility: HOSPITAL | Age: 70
Setting detail: RECURRING SERIES
Discharge: HOME | End: 2025-06-27
Attending: INTERNAL MEDICINE
Payer: MEDICARE

## 2025-06-24 VITALS — BODY MASS INDEX: 28.41 KG/M2 | WEIGHT: 181.4 LBS

## 2025-06-24 PROCEDURE — 93797 PHYS/QHP OP CAR RHAB WO ECG: CPT

## 2025-06-24 PROCEDURE — 93798 PHYS/QHP OP CAR RHAB W/ECG: CPT

## 2025-06-24 ASSESSMENT — EXERCISE STRESS TEST
PEAK_METS: 2.4
PEAK_RPE: 12

## 2025-06-26 ENCOUNTER — HOSPITAL ENCOUNTER (OUTPATIENT)
Facility: HOSPITAL | Age: 70
Setting detail: RECURRING SERIES
Discharge: HOME | End: 2025-06-29
Attending: INTERNAL MEDICINE
Payer: MEDICARE

## 2025-06-26 VITALS — BODY MASS INDEX: 28.57 KG/M2 | WEIGHT: 182.4 LBS

## 2025-06-26 PROCEDURE — 93798 PHYS/QHP OP CAR RHAB W/ECG: CPT

## 2025-06-26 ASSESSMENT — EXERCISE STRESS TEST
PEAK_METS: 2.4
PEAK_RPE: 12

## 2025-07-01 ENCOUNTER — HOSPITAL ENCOUNTER (OUTPATIENT)
Facility: HOSPITAL | Age: 70
Setting detail: RECURRING SERIES
Discharge: HOME | End: 2025-07-04
Attending: INTERNAL MEDICINE
Payer: MEDICARE

## 2025-07-01 VITALS — BODY MASS INDEX: 28.38 KG/M2 | WEIGHT: 181.2 LBS

## 2025-07-01 PROCEDURE — 93797 PHYS/QHP OP CAR RHAB WO ECG: CPT

## 2025-07-01 PROCEDURE — 93798 PHYS/QHP OP CAR RHAB W/ECG: CPT

## 2025-07-01 ASSESSMENT — EXERCISE STRESS TEST
PEAK_METS: 2.6
PEAK_RPE: 12

## 2025-07-03 ENCOUNTER — HOSPITAL ENCOUNTER (OUTPATIENT)
Facility: HOSPITAL | Age: 70
Setting detail: RECURRING SERIES
Discharge: HOME | End: 2025-07-06
Attending: INTERNAL MEDICINE
Payer: MEDICARE

## 2025-07-03 VITALS — BODY MASS INDEX: 28.54 KG/M2 | WEIGHT: 182.2 LBS

## 2025-07-03 PROCEDURE — 93798 PHYS/QHP OP CAR RHAB W/ECG: CPT

## 2025-07-03 ASSESSMENT — EXERCISE STRESS TEST
PEAK_METS: 2.7
PEAK_RPE: 12

## 2025-07-08 ENCOUNTER — HOSPITAL ENCOUNTER (OUTPATIENT)
Facility: HOSPITAL | Age: 70
Setting detail: RECURRING SERIES
Discharge: HOME | End: 2025-07-11
Attending: INTERNAL MEDICINE
Payer: MEDICARE

## 2025-07-08 VITALS — BODY MASS INDEX: 28.32 KG/M2 | WEIGHT: 180.8 LBS

## 2025-07-08 PROCEDURE — 93798 PHYS/QHP OP CAR RHAB W/ECG: CPT

## 2025-07-08 PROCEDURE — 93797 PHYS/QHP OP CAR RHAB WO ECG: CPT

## 2025-07-08 ASSESSMENT — EXERCISE STRESS TEST
PEAK_METS: 2.7
PEAK_RPE: 12

## 2025-07-10 ENCOUNTER — HOSPITAL ENCOUNTER (OUTPATIENT)
Facility: HOSPITAL | Age: 70
Setting detail: RECURRING SERIES
Discharge: HOME | End: 2025-07-13
Attending: INTERNAL MEDICINE
Payer: MEDICARE

## 2025-07-10 VITALS — WEIGHT: 181.6 LBS | BODY MASS INDEX: 28.44 KG/M2

## 2025-07-10 PROCEDURE — 93798 PHYS/QHP OP CAR RHAB W/ECG: CPT

## 2025-07-10 ASSESSMENT — EXERCISE STRESS TEST
PEAK_RPE: 12
PEAK_METS: 2.7

## 2025-07-15 ENCOUNTER — HOSPITAL ENCOUNTER (OUTPATIENT)
Facility: HOSPITAL | Age: 70
Setting detail: RECURRING SERIES
Discharge: HOME | End: 2025-07-18
Attending: INTERNAL MEDICINE
Payer: MEDICARE

## 2025-07-15 VITALS — BODY MASS INDEX: 28.69 KG/M2 | WEIGHT: 183.2 LBS

## 2025-07-15 PROCEDURE — 93798 PHYS/QHP OP CAR RHAB W/ECG: CPT

## 2025-07-15 PROCEDURE — 93797 PHYS/QHP OP CAR RHAB WO ECG: CPT

## 2025-07-15 ASSESSMENT — EXERCISE STRESS TEST
PEAK_RPE: 12
PEAK_METS: 3

## 2025-07-17 ENCOUNTER — HOSPITAL ENCOUNTER (OUTPATIENT)
Facility: HOSPITAL | Age: 70
Setting detail: RECURRING SERIES
Discharge: HOME | End: 2025-07-20
Attending: INTERNAL MEDICINE
Payer: MEDICARE

## 2025-07-17 VITALS — WEIGHT: 182.8 LBS | BODY MASS INDEX: 28.63 KG/M2

## 2025-07-17 PROCEDURE — 93798 PHYS/QHP OP CAR RHAB W/ECG: CPT

## 2025-07-17 ASSESSMENT — EXERCISE STRESS TEST
PEAK_METS: 4
PEAK_RPE: 12

## 2025-07-22 ENCOUNTER — HOSPITAL ENCOUNTER (OUTPATIENT)
Facility: HOSPITAL | Age: 70
Setting detail: RECURRING SERIES
Discharge: HOME | End: 2025-07-25
Attending: INTERNAL MEDICINE
Payer: MEDICARE

## 2025-07-22 VITALS — WEIGHT: 182.2 LBS | BODY MASS INDEX: 28.54 KG/M2

## 2025-07-22 PROCEDURE — 93797 PHYS/QHP OP CAR RHAB WO ECG: CPT

## 2025-07-22 PROCEDURE — 93798 PHYS/QHP OP CAR RHAB W/ECG: CPT

## 2025-07-22 ASSESSMENT — EXERCISE STRESS TEST
PEAK_RPE: 12
PEAK_METS: 4

## 2025-07-24 ENCOUNTER — HOSPITAL ENCOUNTER (OUTPATIENT)
Facility: HOSPITAL | Age: 70
Setting detail: RECURRING SERIES
Discharge: HOME | End: 2025-07-27
Attending: INTERNAL MEDICINE
Payer: MEDICARE

## 2025-07-24 VITALS — WEIGHT: 182.2 LBS | BODY MASS INDEX: 28.54 KG/M2

## 2025-07-24 PROCEDURE — 93798 PHYS/QHP OP CAR RHAB W/ECG: CPT

## 2025-07-24 ASSESSMENT — EXERCISE STRESS TEST
PEAK_RPE: 12
PEAK_METS: 4

## 2025-07-29 ENCOUNTER — APPOINTMENT (OUTPATIENT)
Facility: HOSPITAL | Age: 70
End: 2025-07-29
Attending: INTERNAL MEDICINE
Payer: MEDICARE

## 2025-07-31 ENCOUNTER — APPOINTMENT (OUTPATIENT)
Facility: HOSPITAL | Age: 70
End: 2025-07-31
Attending: INTERNAL MEDICINE
Payer: MEDICARE

## 2025-08-05 ENCOUNTER — APPOINTMENT (OUTPATIENT)
Facility: HOSPITAL | Age: 70
End: 2025-08-05
Attending: INTERNAL MEDICINE
Payer: MEDICARE

## 2025-08-12 ENCOUNTER — HOSPITAL ENCOUNTER (OUTPATIENT)
Facility: HOSPITAL | Age: 70
Setting detail: RECURRING SERIES
Discharge: HOME | End: 2025-08-15
Attending: INTERNAL MEDICINE
Payer: MEDICARE

## 2025-08-12 VITALS — BODY MASS INDEX: 28.57 KG/M2 | WEIGHT: 182.4 LBS

## 2025-08-12 PROCEDURE — 93798 PHYS/QHP OP CAR RHAB W/ECG: CPT

## 2025-08-12 ASSESSMENT — EXERCISE STRESS TEST
PEAK_METS: 4
PEAK_RPE: 12

## 2025-08-14 ENCOUNTER — HOSPITAL ENCOUNTER (OUTPATIENT)
Facility: HOSPITAL | Age: 70
Setting detail: RECURRING SERIES
Discharge: HOME | End: 2025-08-17
Attending: INTERNAL MEDICINE
Payer: MEDICARE

## 2025-08-14 VITALS — BODY MASS INDEX: 28.63 KG/M2 | WEIGHT: 182.8 LBS

## 2025-08-14 PROCEDURE — 93798 PHYS/QHP OP CAR RHAB W/ECG: CPT

## 2025-08-14 RX ORDER — TROSPIUM CHLORIDE 20 MG/1
20 TABLET, FILM COATED ORAL 2 TIMES DAILY
COMMUNITY
Start: 2025-08-01

## 2025-08-14 ASSESSMENT — EXERCISE STRESS TEST
PEAK_RPE: 12
PEAK_METS: 4

## 2025-08-19 ENCOUNTER — HOSPITAL ENCOUNTER (OUTPATIENT)
Facility: HOSPITAL | Age: 70
Setting detail: RECURRING SERIES
Discharge: HOME | End: 2025-08-22
Attending: INTERNAL MEDICINE
Payer: MEDICARE

## 2025-08-19 VITALS — BODY MASS INDEX: 28.54 KG/M2 | WEIGHT: 182.2 LBS

## 2025-08-19 PROCEDURE — 93798 PHYS/QHP OP CAR RHAB W/ECG: CPT

## 2025-08-19 PROCEDURE — 93797 PHYS/QHP OP CAR RHAB WO ECG: CPT

## 2025-08-19 ASSESSMENT — EXERCISE STRESS TEST
PEAK_METS: 4
PEAK_RPE: 12

## 2025-08-21 ENCOUNTER — HOSPITAL ENCOUNTER (OUTPATIENT)
Facility: HOSPITAL | Age: 70
Setting detail: RECURRING SERIES
Discharge: HOME | End: 2025-08-24
Attending: INTERNAL MEDICINE
Payer: MEDICARE

## 2025-08-21 VITALS — BODY MASS INDEX: 28.6 KG/M2 | WEIGHT: 182.6 LBS

## 2025-08-21 PROCEDURE — 93798 PHYS/QHP OP CAR RHAB W/ECG: CPT

## 2025-08-21 ASSESSMENT — EXERCISE STRESS TEST
PEAK_RPE: 12
PEAK_METS: 4

## 2025-08-26 ENCOUNTER — HOSPITAL ENCOUNTER (OUTPATIENT)
Facility: HOSPITAL | Age: 70
Setting detail: RECURRING SERIES
Discharge: HOME | End: 2025-08-29
Attending: INTERNAL MEDICINE
Payer: MEDICARE

## 2025-08-26 VITALS — BODY MASS INDEX: 28.57 KG/M2 | WEIGHT: 182.4 LBS

## 2025-08-26 PROCEDURE — 93798 PHYS/QHP OP CAR RHAB W/ECG: CPT

## 2025-08-26 PROCEDURE — 93797 PHYS/QHP OP CAR RHAB WO ECG: CPT

## 2025-08-26 ASSESSMENT — EXERCISE STRESS TEST
PEAK_RPE: 12
PEAK_METS: 4

## 2025-08-28 ENCOUNTER — HOSPITAL ENCOUNTER (OUTPATIENT)
Facility: HOSPITAL | Age: 70
Setting detail: RECURRING SERIES
Discharge: HOME | End: 2025-08-31
Attending: INTERNAL MEDICINE
Payer: MEDICARE

## 2025-08-28 VITALS — BODY MASS INDEX: 28.54 KG/M2 | WEIGHT: 182.2 LBS

## 2025-08-28 PROCEDURE — 93798 PHYS/QHP OP CAR RHAB W/ECG: CPT

## 2025-08-28 ASSESSMENT — EXERCISE STRESS TEST
PEAK_RPE: 12
PEAK_METS: 4

## 2025-09-02 ENCOUNTER — HOSPITAL ENCOUNTER (OUTPATIENT)
Facility: HOSPITAL | Age: 70
Setting detail: RECURRING SERIES
Discharge: HOME | End: 2025-09-05
Attending: INTERNAL MEDICINE
Payer: MEDICARE

## 2025-09-02 VITALS — WEIGHT: 182.6 LBS | BODY MASS INDEX: 28.6 KG/M2

## 2025-09-02 PROCEDURE — 93798 PHYS/QHP OP CAR RHAB W/ECG: CPT

## 2025-09-02 ASSESSMENT — PATIENT HEALTH QUESTIONNAIRE - PHQ9
SUM OF ALL RESPONSES TO PHQ QUESTIONS 1-9: 0
6. FEELING BAD ABOUT YOURSELF - OR THAT YOU ARE A FAILURE OR HAVE LET YOURSELF OR YOUR FAMILY DOWN: NOT AT ALL
8. MOVING OR SPEAKING SO SLOWLY THAT OTHER PEOPLE COULD HAVE NOTICED. OR THE OPPOSITE, BEING SO FIGETY OR RESTLESS THAT YOU HAVE BEEN MOVING AROUND A LOT MORE THAN USUAL: NOT AT ALL
9. THOUGHTS THAT YOU WOULD BE BETTER OFF DEAD, OR OF HURTING YOURSELF: NOT AT ALL
2. FEELING DOWN, DEPRESSED OR HOPELESS: NOT AT ALL
SUM OF ALL RESPONSES TO PHQ QUESTIONS 1-9: 0
4. FEELING TIRED OR HAVING LITTLE ENERGY: NOT AT ALL
SUM OF ALL RESPONSES TO PHQ QUESTIONS 1-9: 0
SUM OF ALL RESPONSES TO PHQ QUESTIONS 1-9: 0
10. IF YOU CHECKED OFF ANY PROBLEMS, HOW DIFFICULT HAVE THESE PROBLEMS MADE IT FOR YOU TO DO YOUR WORK, TAKE CARE OF THINGS AT HOME, OR GET ALONG WITH OTHER PEOPLE: NOT DIFFICULT AT ALL
5. POOR APPETITE OR OVEREATING: NOT AT ALL
1. LITTLE INTEREST OR PLEASURE IN DOING THINGS: NOT AT ALL
3. TROUBLE FALLING OR STAYING ASLEEP: NOT AT ALL
7. TROUBLE CONCENTRATING ON THINGS, SUCH AS READING THE NEWSPAPER OR WATCHING TELEVISION: NOT AT ALL

## 2025-09-02 ASSESSMENT — EXERCISE STRESS TEST
PEAK_RPE: 12
PEAK_METS: 4

## (undated) DEVICE — Device: Brand: EAGLE EYE PLATINUM RX DIGITAL IVUS CATHETER

## (undated) DEVICE — CATHETER BLLN SPRINTER OTW 2MMX12MM 138CM

## (undated) DEVICE — CATHETER GUID 6FR L100CM DIA0.071IN NYL SHFT EBU3.5

## (undated) DEVICE — SPECIAL PROCEDURE DRAPE 32" X 34": Brand: SPECIAL PROCEDURE DRAPE

## (undated) DEVICE — RUNTHROUGH NS EXTRA FLOPPY PTCA GUIDEWIRE: Brand: RUNTHROUGH

## (undated) DEVICE — Device

## (undated) DEVICE — KIT MFLD ISOLATN NACL CNTRST PRT TBNG SPIK W/ PRSS TRNSDUC

## (undated) DEVICE — CATHETER DIAG 5FR L100CM LUMN ID0.047IN JR4 CRV 0 SIDE H

## (undated) DEVICE — CATHETER DIAG 5FR L100CM LUMN ID0.047IN JL3.5 CRV 0 SIDE H

## (undated) DEVICE — ANGIOGRAPHY KIT

## (undated) DEVICE — TR BAND RADIAL ARTERY COMPRESSION DEVICE: Brand: TR BAND

## (undated) DEVICE — COPILOT BLEEDBACK CONTROL VALVE: Brand: COPILOT

## (undated) DEVICE — GLIDESHEATH SLENDER ACCESS KIT: Brand: GLIDESHEATH SLENDER

## (undated) DEVICE — PADPRO DEFIBRILLATION/PACING/CARDIOVERSION/MONITORING ELECTRODES, ADULT/CHILD GREATER THAN 10 KG RADIOTRANSPARENT ELECTRODE, PHYSIO-CONTROL QUIK-COMBO (M) 60" (152 CM): Brand: PADPRO

## (undated) DEVICE — KIT AT-X65 ANGIOTOUCH HAND CONTROLLER

## (undated) DEVICE — CATH BLLN ANGIO 4X12MM SC EUPHORA RX

## (undated) DEVICE — KIT MED IMAG CNTRST AGNT W/ IOPAMIDOL REUSE

## (undated) DEVICE — PROVE COVER: Brand: UNBRANDED

## (undated) DEVICE — CUSTOM KT PTCA INFL DEV K05 00052M

## (undated) DEVICE — WASTE KIT - ST MARY: Brand: MEDLINE INDUSTRIES, INC.

## (undated) DEVICE — SPLINT WR VELC FOAM NEUT POS DISP FOR RAD ART ACC SFT STRP

## (undated) DEVICE — KIT HND CTRL 3 W STPCOCK ROT END 54IN PREM HI PRSS TBNG AT